# Patient Record
Sex: FEMALE | Race: BLACK OR AFRICAN AMERICAN | NOT HISPANIC OR LATINO | URBAN - METROPOLITAN AREA
[De-identification: names, ages, dates, MRNs, and addresses within clinical notes are randomized per-mention and may not be internally consistent; named-entity substitution may affect disease eponyms.]

---

## 2020-07-25 ENCOUNTER — TELEPHONE ENCOUNTER (OUTPATIENT)
Dept: URBAN - METROPOLITAN AREA CLINIC 13 | Facility: CLINIC | Age: 35
End: 2020-07-25

## 2020-07-26 ENCOUNTER — TELEPHONE ENCOUNTER (OUTPATIENT)
Dept: URBAN - METROPOLITAN AREA CLINIC 13 | Facility: CLINIC | Age: 35
End: 2020-07-26

## 2020-09-29 PROBLEM — 235675006 GASTROPARESIS: Status: ACTIVE | Noted: 2020-09-29

## 2020-10-06 ENCOUNTER — DASHBOARD ENCOUNTERS (OUTPATIENT)
Age: 35
End: 2020-10-06

## 2020-10-07 ENCOUNTER — OFFICE VISIT (OUTPATIENT)
Dept: URBAN - METROPOLITAN AREA CLINIC 113 | Facility: CLINIC | Age: 35
End: 2020-10-07

## 2020-10-07 NOTE — HPI-TODAY'S VISIT:
Ms. Rey is a 35-year-old female with history of diabetes mellitus and DKA, presenting for evaluation of gastroparesis. She has been seen during multiple hospitalizations over the last several years.  During last hospitalization in September, 2017 gastroparesis diet and glycemic control were encouraged.  She was also started on liquid PPI.  She was unable to tolerate metoclopramide.

## 2023-08-27 ENCOUNTER — CLAIMS CREATED FROM THE CLAIM WINDOW (OUTPATIENT)
Dept: URBAN - METROPOLITAN AREA MEDICAL CENTER 43 | Facility: MEDICAL CENTER | Age: 38
End: 2023-08-27
Payer: MEDICARE

## 2023-08-27 DIAGNOSIS — R10.84 GENERALIZED ABDOMINAL PAIN: ICD-10-CM

## 2023-08-27 DIAGNOSIS — D50.0 IRON DEFICIENCY ANEMIA SECONDARY TO BLOOD LOSS (CHRONIC): ICD-10-CM

## 2023-08-27 DIAGNOSIS — E11.43 TYPE 2 DIABETES MELLITUS WITH DIABETIC AUTONOMIC (POLY)NEUROPATHY: ICD-10-CM

## 2023-08-27 DIAGNOSIS — K31.84 GASTROPARESIS: ICD-10-CM

## 2023-08-27 PROCEDURE — 99222 1ST HOSP IP/OBS MODERATE 55: CPT | Performed by: INTERNAL MEDICINE

## 2023-11-13 ENCOUNTER — OFFICE VISIT (OUTPATIENT)
Dept: URBAN - METROPOLITAN AREA CLINIC 113 | Facility: CLINIC | Age: 38
End: 2023-11-13

## 2023-11-13 NOTE — HPI-TODAY'S VISIT:
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  She has been followed by Dr. Adorno since 2003 for persistent pancreatitis.  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  She has been seen as inpatient consultation multiple times since then.  Hospital EGD in 2007 for persistent nausea and vomiting revealed moderate erosive esophagitis likely secondary to her emesis otherwise no abnormalities were seen.  She was most recently hospitalized in August for abdominal pain, nausea vomiting and sugars in the 400s.  She has recurrent history of DKA and episodic nausea and vomiting.  She underwent upper endoscopy in 2018 which revealed some gastritis otherwise unremarkable.  Regarding her gastroparesis, gluten glycemic control and narcotic avoidance was the treatment goal.  She is allergic to metoclopramide.  Of note CT scan of the abdomen/pelvis on 8/9/2020 through revealed no acute abnormality with no obstruction or inflammation.  Her liver enzymes and lipase are normal.  We could consider some dicyclomine or hyoscyamine as needed for discomfort.  Regarding her anemia, this is not likely of GI source.  She was to consider IV iron for treatment.